# Patient Record
Sex: MALE | Race: OTHER | NOT HISPANIC OR LATINO | URBAN - METROPOLITAN AREA
[De-identification: names, ages, dates, MRNs, and addresses within clinical notes are randomized per-mention and may not be internally consistent; named-entity substitution may affect disease eponyms.]

---

## 2024-09-10 ENCOUNTER — LAB OUTSIDE AN ENCOUNTER (OUTPATIENT)
Dept: URBAN - METROPOLITAN AREA CLINIC 48 | Facility: CLINIC | Age: 39
End: 2024-09-10

## 2024-09-10 ENCOUNTER — DASHBOARD ENCOUNTERS (OUTPATIENT)
Age: 39
End: 2024-09-10

## 2024-09-10 ENCOUNTER — OFFICE VISIT (OUTPATIENT)
Dept: URBAN - METROPOLITAN AREA CLINIC 48 | Facility: CLINIC | Age: 39
End: 2024-09-10
Payer: COMMERCIAL

## 2024-09-10 VITALS
SYSTOLIC BLOOD PRESSURE: 134 MMHG | BODY MASS INDEX: 34.68 KG/M2 | HEIGHT: 66 IN | WEIGHT: 215.8 LBS | HEART RATE: 86 BPM | TEMPERATURE: 97.9 F | DIASTOLIC BLOOD PRESSURE: 82 MMHG

## 2024-09-10 DIAGNOSIS — R19.4 CHANGE IN BOWEL HABITS: ICD-10-CM

## 2024-09-10 DIAGNOSIS — R14.0 ABDOMINAL BLOATING: ICD-10-CM

## 2024-09-10 DIAGNOSIS — R11.14 BILIOUS VOMITING WITH NAUSEA: ICD-10-CM

## 2024-09-10 DIAGNOSIS — R79.89 ELEVATED LFTS: ICD-10-CM

## 2024-09-10 PROBLEM — 88111009: Status: ACTIVE | Noted: 2024-09-10

## 2024-09-10 PROBLEM — 863927004: Status: ACTIVE | Noted: 2024-09-10

## 2024-09-10 PROBLEM — 71419002: Status: ACTIVE | Noted: 2024-09-10

## 2024-09-10 PROBLEM — 116289008: Status: ACTIVE | Noted: 2024-09-10

## 2024-09-10 PROBLEM — 442076002: Status: ACTIVE | Noted: 2024-09-10

## 2024-09-10 PROCEDURE — 99204 OFFICE O/P NEW MOD 45 MIN: CPT | Performed by: INTERNAL MEDICINE

## 2024-09-10 RX ORDER — PANTOPRAZOLE SODIUM 40 MG/1
1 TABLET TABLET, DELAYED RELEASE ORAL EVERY EVENING
Qty: 30 | Refills: 3 | OUTPATIENT
Start: 2024-09-10

## 2024-09-10 RX ORDER — IPRATROPIUM BROMIDE AND ALBUTEROL SULFATE .5; 3 MG/3ML; MG/3ML
INHALE ONE VIAL VIA NEBULIZER EVERY 6 TO 8 HOURS AS NEEDED SOLUTION RESPIRATORY (INHALATION)
Qty: 270 UNSPECIFIED | Refills: 0 | Status: ACTIVE | COMMUNITY

## 2024-09-10 RX ORDER — FLUTICASONE FUROATE, UMECLIDINIUM BROMIDE AND VILANTEROL TRIFENATATE 200; 62.5; 25 UG/1; UG/1; UG/1
INHALE ONE PUFF BY MOUTH ONE TIME DAILY FOR 30 DAYS POWDER RESPIRATORY (INHALATION)
Qty: 60 UNSPECIFIED | Refills: 0 | Status: ACTIVE | COMMUNITY

## 2024-09-10 RX ORDER — FUROSEMIDE 20 MG/1
TAKE ONE TABLET BY MOUTH ONE TIME DAILY TABLET ORAL ONCE A DAY
Refills: 0 | Status: ACTIVE | COMMUNITY

## 2024-09-10 RX ORDER — ONDANSETRON 4 MG/1
1 TABLET ON THE TONGUE AND ALLOW TO DISSOLVE TABLET, ORALLY DISINTEGRATING ORAL
Qty: 60 | Refills: 3 | OUTPATIENT
Start: 2024-09-10

## 2024-09-10 NOTE — HPI-TODAY'S VISIT:
39 year old male presents for evaluation of a change in bowel habits and nausea. He has had 3-4 months of nausea and it occurs every morning with dry heaves or vomiting. Admits to early satiety, belching, and increase gas. If he belches and passes gas, he feels better. Denies NSAID use. He has not had abdominal pain. Reflux meds did not help. Admits to occasional heartburn. Bowel movements are daily of formed stool but he was having diarrhea. Denies blood in stool. The patient does not know his family history.   3/2024 LFTs: , , alkaline phosphatase 120, and tbili 0.4. Repeat LFTs 6/2024: , , alkaline phosphatase 345, and tbili 0.7. CBC is unremarkable. The patient was recently started on Lasix for lower extremity edema. Admits to hx of 3 pints of liquor per day. He has decreased to 1/2 pint a day now.

## 2024-09-10 NOTE — PHYSICAL EXAM CHEST:
no lesions, no deformities, no traumatic injuries, no significant scars are present, chest wall non-tender, no masses present, breathing is unlabored without accessory muscle use, expiratory wheezes

## 2024-11-08 ENCOUNTER — TELEPHONE ENCOUNTER (OUTPATIENT)
Dept: URBAN - METROPOLITAN AREA CLINIC 48 | Facility: CLINIC | Age: 39
End: 2024-11-08

## 2024-11-11 ENCOUNTER — OFFICE VISIT (OUTPATIENT)
Dept: URBAN - METROPOLITAN AREA TELEHEALTH 2 | Facility: TELEHEALTH | Age: 39
End: 2024-11-11

## 2024-11-11 ENCOUNTER — TELEPHONE ENCOUNTER (OUTPATIENT)
Dept: URBAN - METROPOLITAN AREA CLINIC 46 | Facility: CLINIC | Age: 39
End: 2024-11-11

## 2024-11-11 RX ORDER — IPRATROPIUM BROMIDE AND ALBUTEROL SULFATE .5; 3 MG/3ML; MG/3ML
3 ML AS NEEDED SOLUTION RESPIRATORY (INHALATION)
Refills: 0 | Status: ACTIVE | COMMUNITY

## 2024-11-11 RX ORDER — FLUTICASONE FUROATE, UMECLIDINIUM BROMIDE AND VILANTEROL TRIFENATATE 200; 62.5; 25 UG/1; UG/1; UG/1
1 PUFF POWDER RESPIRATORY (INHALATION) ONCE A DAY
Refills: 0 | Status: ACTIVE | COMMUNITY

## 2024-11-11 RX ORDER — ONDANSETRON 4 MG/1
1 TABLET ON THE TONGUE AND ALLOW TO DISSOLVE TABLET, ORALLY DISINTEGRATING ORAL
Refills: 3 | Status: ACTIVE | COMMUNITY
Start: 2024-09-10

## 2024-11-11 RX ORDER — PANTOPRAZOLE SODIUM 40 MG/1
1 TABLET TABLET, DELAYED RELEASE ORAL ONCE A DAY
Qty: 30 TABLET | Refills: 3 | Status: ACTIVE | COMMUNITY
Start: 2024-09-10

## 2024-11-11 RX ORDER — FUROSEMIDE 20 MG/1
1 TABLET TABLET ORAL ONCE A DAY
Refills: 0 | Status: ACTIVE | COMMUNITY

## 2024-11-11 NOTE — HPI-TODAY'S VISIT:
39 year old male presents for follow up of a change in bowel habits and nausea. He has had 3-4 months of nausea occuring every morning with dry heaves or vomiting. Admits to early satiety, belching, and increase gas. If he belches and passes gas, he feels better. Gastric empty study was ordered but not done. Denies NSAID use. Pantoprazole wea initiated.   Bowel movements are daily of formed stool but he was having diarrhea. Denies blood in stool.   3/2024 LFTs: , , alkaline phosphatase 120, and tbili 0.4. Repeat LFTs 6/2024: , , alkaline phosphatase 345, and tbili 0.7. CBC is unremarkable. The patient was recently started on Lasix for lower extremity edema. Admits to hx of 3 pints of liquor per day. He has decreased to 1/2 pint a day now. An US was ordered but not done.

## 2024-11-25 ENCOUNTER — ERX REFILL RESPONSE (OUTPATIENT)
Dept: URBAN - METROPOLITAN AREA CLINIC 44 | Facility: CLINIC | Age: 39
End: 2024-11-25

## 2024-11-25 RX ORDER — PANTOPRAZOLE SODIUM 40 MG/1
1 TABLET TABLET, DELAYED RELEASE ORAL ONCE A DAY
Qty: 30 TABLET | Refills: 3 | OUTPATIENT

## 2025-01-28 ENCOUNTER — TELEPHONE ENCOUNTER (OUTPATIENT)
Dept: URBAN - METROPOLITAN AREA CLINIC 46 | Facility: CLINIC | Age: 40
End: 2025-01-28

## 2025-01-28 ENCOUNTER — LAB OUTSIDE AN ENCOUNTER (OUTPATIENT)
Dept: URBAN - METROPOLITAN AREA CLINIC 48 | Facility: CLINIC | Age: 40
End: 2025-01-28

## 2025-01-28 ENCOUNTER — OFFICE VISIT (OUTPATIENT)
Dept: URBAN - METROPOLITAN AREA CLINIC 48 | Facility: CLINIC | Age: 40
End: 2025-01-28
Payer: COMMERCIAL

## 2025-01-28 VITALS
BODY MASS INDEX: 34.72 KG/M2 | TEMPERATURE: 97.9 F | SYSTOLIC BLOOD PRESSURE: 152 MMHG | WEIGHT: 216 LBS | HEIGHT: 66 IN | HEART RATE: 105 BPM | DIASTOLIC BLOOD PRESSURE: 89 MMHG

## 2025-01-28 DIAGNOSIS — R10.13 EPIGASTRIC PAIN: ICD-10-CM

## 2025-01-28 DIAGNOSIS — R19.4 CHANGE IN BOWEL HABITS: ICD-10-CM

## 2025-01-28 DIAGNOSIS — F10.10 ALCOHOL ABUSE: ICD-10-CM

## 2025-01-28 DIAGNOSIS — K62.5 RECTAL BLEEDING: ICD-10-CM

## 2025-01-28 DIAGNOSIS — R79.89 ELEVATED LFTS: ICD-10-CM

## 2025-01-28 DIAGNOSIS — K92.0 HEMATEMESIS WITH NAUSEA: ICD-10-CM

## 2025-01-28 DIAGNOSIS — R11.0 NAUSEA: ICD-10-CM

## 2025-01-28 PROBLEM — 266435005: Status: ACTIVE | Noted: 2025-01-28

## 2025-01-28 PROBLEM — 15167005: Status: ACTIVE | Noted: 2025-01-28

## 2025-01-28 PROCEDURE — 99214 OFFICE O/P EST MOD 30 MIN: CPT | Performed by: INTERNAL MEDICINE

## 2025-01-28 RX ORDER — HYOSCYAMINE SULFATE 0.12 MG/1
1 TABLET UNDER THE TONGUE AND ALLOW TO DISSOLVE AS NEEDED FOR ABDOMINAL PAIIN TABLET SUBLINGUAL THREE TIMES A DAY
Qty: 90 TABLET | Refills: 1 | OUTPATIENT
Start: 2025-01-28 | End: 2025-03-29

## 2025-01-28 RX ORDER — SUCRALFATE 1 G/1
1 TABLET ON AN EMPTY STOMACH TABLET ORAL THREE TIMES A DAY
Qty: 42 TABLET | Refills: 0 | OUTPATIENT
Start: 2025-01-28 | End: 2025-02-11

## 2025-01-28 RX ORDER — FUROSEMIDE 20 MG/1
1 TABLET TABLET ORAL ONCE A DAY
Qty: 30 | OUTPATIENT
Start: 2025-01-28 | End: 2025-02-27

## 2025-01-28 RX ORDER — IPRATROPIUM BROMIDE AND ALBUTEROL SULFATE .5; 3 MG/3ML; MG/3ML
3 ML AS NEEDED SOLUTION RESPIRATORY (INHALATION)
Refills: 0 | Status: ACTIVE | COMMUNITY

## 2025-01-28 RX ORDER — ONDANSETRON 4 MG/1
1 TABLET ON THE TONGUE AND ALLOW TO DISSOLVE TABLET, ORALLY DISINTEGRATING ORAL
Qty: 30 | Refills: 0 | OUTPATIENT
Start: 2025-01-28

## 2025-01-28 RX ORDER — RIFAXIMIN 550 MG/1
1 TABLET TABLET ORAL THREE TIMES A DAY
Qty: 42 TABLET | Refills: 0 | OUTPATIENT
Start: 2025-01-28 | End: 2025-02-11

## 2025-01-28 RX ORDER — ONDANSETRON 4 MG/1
1 TABLET ON THE TONGUE AND ALLOW TO DISSOLVE TABLET, ORALLY DISINTEGRATING ORAL
Refills: 3 | Status: ACTIVE | COMMUNITY
Start: 2024-09-10

## 2025-01-28 RX ORDER — PANTOPRAZOLE SODIUM 40 MG/1
1 TABLET TABLET, DELAYED RELEASE ORAL ONCE A DAY
Qty: 30 TABLET | Refills: 3 | Status: ACTIVE | COMMUNITY

## 2025-01-28 RX ORDER — FLUTICASONE FUROATE, UMECLIDINIUM BROMIDE AND VILANTEROL TRIFENATATE 200; 62.5; 25 UG/1; UG/1; UG/1
1 PUFF POWDER RESPIRATORY (INHALATION) ONCE A DAY
Refills: 0 | Status: ACTIVE | COMMUNITY

## 2025-01-28 RX ORDER — FUROSEMIDE 20 MG/1
1 TABLET TABLET ORAL ONCE A DAY
Refills: 0 | Status: ACTIVE | COMMUNITY

## 2025-01-28 NOTE — HPI-TODAY'S VISIT:
39 year old male presents for follow up and evaluation of rectal bleeding. Seen last for nausea, diarrhea, elevated LFTs. Nausea occurs most days and is worse in the morning and late at night.  He has had some hematemesis. Admits to early satiety, belching, and increase gas along with epigastric discomfort. Gastric empty study was ordered but not done. Denies NSAID use. Pantoprazole was initiated and he continues to take daily.   Recently, he began having spotty rectal bleeding. He has multiple urgent loose stools daily but then feels he has incomplete evacuation. TSH was normal 1/23/25.   Admits to drinking up to a gallon of liquor daily but has decreased to a fifth. He also takes Oxycodone frequently. He has lost 40 lbs due to decrease appetite and early satiety. 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 He continues to smoke cigarettes but denies other street drugs. He was on Lasix which greatly improved his lower extremity edema. An US was ordered at his last appointment but he did not do it. LFTs have been elevated.  Labs: , , tbili 1, alkaline phosphatase 231, GGT 1,268. Albumin is 4.4. Hgb 15, , platelets not recently checked.

## 2025-02-03 ENCOUNTER — TELEPHONE ENCOUNTER (OUTPATIENT)
Dept: URBAN - METROPOLITAN AREA CLINIC 48 | Facility: CLINIC | Age: 40
End: 2025-02-03

## 2025-02-04 ENCOUNTER — OFFICE VISIT (OUTPATIENT)
Dept: URBAN - METROPOLITAN AREA CLINIC 48 | Facility: CLINIC | Age: 40
End: 2025-02-04

## 2025-02-07 ENCOUNTER — TELEPHONE ENCOUNTER (OUTPATIENT)
Dept: URBAN - METROPOLITAN AREA CLINIC 46 | Facility: CLINIC | Age: 40
End: 2025-02-07

## 2025-03-04 ENCOUNTER — OFFICE VISIT (OUTPATIENT)
Dept: URBAN - METROPOLITAN AREA CLINIC 44 | Facility: CLINIC | Age: 40
End: 2025-03-04

## 2025-03-04 ENCOUNTER — OFFICE VISIT (OUTPATIENT)
Dept: URBAN - METROPOLITAN AREA CLINIC 48 | Facility: CLINIC | Age: 40
End: 2025-03-04

## 2025-03-04 RX ORDER — FLUTICASONE FUROATE, UMECLIDINIUM BROMIDE AND VILANTEROL TRIFENATATE 200; 62.5; 25 UG/1; UG/1; UG/1
1 PUFF POWDER RESPIRATORY (INHALATION) ONCE A DAY
Refills: 0 | Status: ACTIVE | COMMUNITY

## 2025-03-04 RX ORDER — ONDANSETRON 4 MG/1
1 TABLET ON THE TONGUE AND ALLOW TO DISSOLVE TABLET, ORALLY DISINTEGRATING ORAL
Refills: 3 | Status: ACTIVE | COMMUNITY
Start: 2024-09-10

## 2025-03-04 RX ORDER — PANTOPRAZOLE SODIUM 40 MG/1
1 TABLET TABLET, DELAYED RELEASE ORAL ONCE A DAY
Qty: 30 TABLET | Refills: 3 | Status: ACTIVE | COMMUNITY

## 2025-03-04 RX ORDER — FUROSEMIDE 20 MG/1
1 TABLET TABLET ORAL ONCE A DAY
Refills: 0 | Status: ACTIVE | COMMUNITY

## 2025-03-04 RX ORDER — HYOSCYAMINE SULFATE 0.12 MG/1
1 TABLET UNDER THE TONGUE AND ALLOW TO DISSOLVE AS NEEDED FOR ABDOMINAL PAIIN TABLET SUBLINGUAL THREE TIMES A DAY
Qty: 90 TABLET | Refills: 1 | Status: ACTIVE | COMMUNITY
Start: 2025-01-28 | End: 2025-03-29

## 2025-03-04 RX ORDER — ONDANSETRON 4 MG/1
1 TABLET ON THE TONGUE AND ALLOW TO DISSOLVE TABLET, ORALLY DISINTEGRATING ORAL
Qty: 30 | Refills: 0 | Status: ACTIVE | COMMUNITY
Start: 2025-01-28

## 2025-03-04 RX ORDER — IPRATROPIUM BROMIDE AND ALBUTEROL SULFATE .5; 3 MG/3ML; MG/3ML
3 ML AS NEEDED SOLUTION RESPIRATORY (INHALATION)
Refills: 0 | Status: ACTIVE | COMMUNITY

## 2025-04-23 ENCOUNTER — OFFICE VISIT (OUTPATIENT)
Dept: URBAN - METROPOLITAN AREA MEDICAL CENTER 35 | Facility: MEDICAL CENTER | Age: 40
End: 2025-04-23

## 2025-04-28 ENCOUNTER — OFFICE VISIT (OUTPATIENT)
Dept: URBAN - METROPOLITAN AREA CLINIC 44 | Facility: CLINIC | Age: 40
End: 2025-04-28

## 2025-04-28 RX ORDER — ONDANSETRON 4 MG/1
1 TABLET ON THE TONGUE AND ALLOW TO DISSOLVE TABLET, ORALLY DISINTEGRATING ORAL
Qty: 30 | Refills: 0 | Status: ACTIVE | COMMUNITY
Start: 2025-01-28

## 2025-04-28 RX ORDER — IPRATROPIUM BROMIDE AND ALBUTEROL SULFATE .5; 3 MG/3ML; MG/3ML
3 ML AS NEEDED SOLUTION RESPIRATORY (INHALATION)
Refills: 0 | Status: ACTIVE | COMMUNITY

## 2025-04-28 RX ORDER — ONDANSETRON 4 MG/1
1 TABLET ON THE TONGUE AND ALLOW TO DISSOLVE TABLET, ORALLY DISINTEGRATING ORAL
Refills: 3 | Status: ACTIVE | COMMUNITY
Start: 2024-09-10

## 2025-04-28 RX ORDER — PANTOPRAZOLE SODIUM 40 MG/1
1 TABLET TABLET, DELAYED RELEASE ORAL ONCE A DAY
Qty: 30 TABLET | Refills: 3 | Status: ACTIVE | COMMUNITY

## 2025-04-28 RX ORDER — FLUTICASONE FUROATE, UMECLIDINIUM BROMIDE AND VILANTEROL TRIFENATATE 200; 62.5; 25 UG/1; UG/1; UG/1
1 PUFF POWDER RESPIRATORY (INHALATION) ONCE A DAY
Refills: 0 | Status: ACTIVE | COMMUNITY

## 2025-04-28 RX ORDER — FUROSEMIDE 20 MG/1
1 TABLET TABLET ORAL ONCE A DAY
Refills: 0 | Status: ACTIVE | COMMUNITY

## 2025-05-30 ENCOUNTER — OFFICE VISIT (OUTPATIENT)
Dept: URBAN - METROPOLITAN AREA CLINIC 46 | Facility: CLINIC | Age: 40
End: 2025-05-30

## 2025-05-30 RX ORDER — SUCRALFATE 1 G/1
TAKE ONE TABLET BY MOUTH FOUR TIMES A DAY BEFORE MEALS AND NIGHTLY TABLET ORAL
Qty: 360 UNSPECIFIED | Refills: 0 | Status: ACTIVE | COMMUNITY

## 2025-05-30 RX ORDER — IPRATROPIUM BROMIDE AND ALBUTEROL SULFATE .5; 3 MG/3ML; MG/3ML
3 ML AS NEEDED SOLUTION RESPIRATORY (INHALATION)
Refills: 0 | Status: ACTIVE | COMMUNITY

## 2025-05-30 RX ORDER — LEVOFLOXACIN 750 MG/1
TAKE ONE TABLET BY MOUTH EVERY MORNING FOR 7 DAYS TABLET, FILM COATED ORAL
Qty: 7 UNSPECIFIED | Refills: 0 | Status: ACTIVE | COMMUNITY

## 2025-05-30 RX ORDER — LACTULOSE 10 G/15ML
SOLUTION ORAL; RECTAL
Qty: 473 MILLILITER | Status: ACTIVE | COMMUNITY

## 2025-05-30 RX ORDER — FLUTICASONE FUROATE, UMECLIDINIUM BROMIDE AND VILANTEROL TRIFENATATE 200; 62.5; 25 UG/1; UG/1; UG/1
1 PUFF POWDER RESPIRATORY (INHALATION) ONCE A DAY
Refills: 0 | Status: ACTIVE | COMMUNITY

## 2025-05-30 RX ORDER — PANTOPRAZOLE SODIUM 40 MG/1
1 TABLET TABLET, DELAYED RELEASE ORAL ONCE A DAY
Qty: 30 TABLET | Refills: 3 | Status: ACTIVE | COMMUNITY

## 2025-05-30 RX ORDER — FOLIC ACID 1 MG/1
TAKE ONE TABLET BY MOUTH EVERY MORNING TABLET ORAL
Qty: 90 UNSPECIFIED | Refills: 0 | Status: ACTIVE | COMMUNITY

## 2025-05-30 RX ORDER — LORAZEPAM 0.5 MG/1
TAKE ONE TABLET BY MOUTH AS A SINGLE ORAL DOSE 15 TO 30 MINUTES PRIOR TO MRI TABLET ORAL
Qty: 2 UNSPECIFIED | Refills: 0 | Status: ACTIVE | COMMUNITY

## 2025-05-30 RX ORDER — CYANOCOBALAMIN 1000 UG/ML
INJECT ONE ML INJECTION EVERY WEEK INJECTION INTRAMUSCULAR; SUBCUTANEOUS
Qty: 12 UNSPECIFIED | Refills: 0 | Status: ACTIVE | COMMUNITY

## 2025-05-30 RX ORDER — FUROSEMIDE 20 MG/1
1 TABLET TABLET ORAL ONCE A DAY
Refills: 0 | Status: ACTIVE | COMMUNITY

## 2025-05-30 RX ORDER — PREDNISONE 20 MG/1
TAKE TWO TABLETS BY MOUTH EVERY MORNING FOR 5 DAYS TABLET ORAL
Qty: 10 UNSPECIFIED | Refills: 0 | Status: ACTIVE | COMMUNITY

## 2025-05-30 RX ORDER — ONDANSETRON 4 MG/1
1 TABLET ON THE TONGUE AND ALLOW TO DISSOLVE TABLET, ORALLY DISINTEGRATING ORAL
Qty: 30 | Refills: 0 | Status: ACTIVE | COMMUNITY
Start: 2025-01-28

## 2025-05-30 RX ORDER — HYOSCYAMINE SULFATE 0.12 MG/1
DISSOLVE ONE TABLET UNDER THE TONGUE THREE TIMES A DAY AS NEEDED TABLET SUBLINGUAL
Qty: 30 UNSPECIFIED | Refills: 0 | Status: ACTIVE | COMMUNITY

## 2025-05-30 NOTE — HPI-TODAY'S VISIT:
40 year old male referred for nausea, vomiting, elevated liver enzymes. Labs May 2025 show CBC w/ low RBC 4.19, .8, MCH 35.9 otherwise wnl. CMP shows low calcium 8.3, alk phos increased at 281, , ALT at 177, ammonia at 47. Previous labs , , tbili 1, alkaline phosphatase 231, GGT 1,268. Albumin is 4.4. Hgb 15, , platelets not recently checked. He was last seen for rectal bleeding, nausea, diarrhea, elevated LFTs. Pt canceled EGD and colon. No stool studies were done although ordered. Nausea occurs most days and is worse in the morning and late at night.  He has had some hematemesis. Admits to early satiety, belching, and increase gas along with epigastric discomfort. Gastric empty study was ordered but not done. Denies NSAID use. Pantoprazole was initiated and he continues to take daily. Last OV was drinking up to a gallon of liquor daily but has decreased to a fifth. He also takes Oxycodone frequently. He has lost 40 lbs due to decrease appetite and early satiety. 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 He continues to smoke cigarettes but denies other street drugs. He was on Lasix which greatly improved his lower extremity edema. An US was ordered at his last appointment but he did not do it. LFTs have been elevated.